# Patient Record
Sex: FEMALE | Race: WHITE | Employment: UNEMPLOYED | URBAN - METROPOLITAN AREA
[De-identification: names, ages, dates, MRNs, and addresses within clinical notes are randomized per-mention and may not be internally consistent; named-entity substitution may affect disease eponyms.]

---

## 2017-02-06 DIAGNOSIS — G35 MULTIPLE SCLEROSIS (H): Primary | ICD-10-CM

## 2017-02-06 DIAGNOSIS — F41.1 ANXIETY STATE: ICD-10-CM

## 2017-02-06 DIAGNOSIS — Z13.6 CARDIOVASCULAR SCREENING; LDL GOAL LESS THAN 160: ICD-10-CM

## 2017-02-06 DIAGNOSIS — F33.0 MAJOR DEPRESSIVE DISORDER, RECURRENT EPISODE, MILD (H): Primary | ICD-10-CM

## 2017-02-06 RX ORDER — CHLORAL HYDRATE 500 MG
2 CAPSULE ORAL DAILY
Qty: 60 CAPSULE | Refills: 5 | Status: SHIPPED
Start: 2017-02-06 | End: 2017-12-15

## 2017-02-06 RX ORDER — ESCITALOPRAM OXALATE 10 MG/1
10 TABLET ORAL DAILY
Qty: 90 TABLET | Refills: 1 | Status: CANCELLED | OUTPATIENT
Start: 2017-02-06

## 2017-02-06 NOTE — TELEPHONE ENCOUNTER
e     Last Written Prescription Date: ***  Last Fill Quantity: ***, # refills: ***  Last Office Visit with [facility] [place] care provider:  ***        Last PHQ-9 score on record=   PHQ-9 SCORE 9/16/2016   Total Score -   Total Score 0

## 2017-02-06 NOTE — TELEPHONE ENCOUNTER
Buspirone 10mg tabs       Last Written Prescription Date: 11/04/2016  Last Fill Quantity: 120; # refills: 1  Last Office Visit with [facility], [facility] or [facility] prescribing provider:  09/16/2016        Last PHQ-9 score on record=   PHQ-9 SCORE 9/16/2016   Total Score -   Total Score 0       AST       23   2/4/2016  ALT       52   2/4/2016      Escitalopram 20mg tabs     Last Written Prescription Date: 08/04/2016  Last Fill Quantity: 90, # refills: 1  Last Office Visit with [facility] [place] care provider:  09/16/2016        Last PHQ-9 score on record=   PHQ-9 SCORE 9/16/2016   Total Score -   Total Score 0

## 2017-02-06 NOTE — TELEPHONE ENCOUNTER
Escitalopram 10mg tabs     Last Written Prescription Date: 08/04/2016  Last Fill Quantity: 90, # refills: 1  Last Office Visit with [facility] primary care provider:  09/16/2016        Last PHQ-9 score on record=   PHQ-9 SCORE 9/16/2016   Total Score -   Total Score 0

## 2017-02-06 NOTE — TELEPHONE ENCOUNTER
Fish oil 1000mg caps      Last Written Prescription Date: 05/02/2016  Last Fill Quantity: 60,  # refills: 5   Last Office Visit with [facility], [facility] or [facility] prescribing provider: 09/16/2016

## 2017-02-07 RX ORDER — BUSPIRONE HYDROCHLORIDE 10 MG/1
10 TABLET ORAL 4 TIMES DAILY
Qty: 120 TABLET | Refills: 0 | Status: SHIPPED | OUTPATIENT
Start: 2017-02-07 | End: 2017-03-22

## 2017-02-07 RX ORDER — ESCITALOPRAM OXALATE 20 MG/1
20 TABLET ORAL DAILY
Qty: 30 TABLET | Refills: 0 | Status: SHIPPED | OUTPATIENT
Start: 2017-02-07 | End: 2017-03-22

## 2017-02-08 ENCOUNTER — TELEPHONE (OUTPATIENT)
Dept: FAMILY MEDICINE | Facility: CLINIC | Age: 47
End: 2017-02-08

## 2017-02-08 DIAGNOSIS — F33.0 MAJOR DEPRESSIVE DISORDER, RECURRENT EPISODE, MILD (H): Primary | ICD-10-CM

## 2017-02-08 RX ORDER — ESCITALOPRAM OXALATE 10 MG/1
10 TABLET ORAL DAILY
Qty: 30 TABLET | Refills: 0 | Status: SHIPPED | OUTPATIENT
Start: 2017-02-08 | End: 2017-03-23

## 2017-02-08 NOTE — TELEPHONE ENCOUNTER
[place] pharmacist calls re 2/7/17 escitalopram 20 mg Rx refill.   Pt takes 20 AND 10 for total 30 mg daily.   Requests new 10 mg Rx.     Rosetta, this looks OK.  I tried to call Romelia but she did not answer her phone.   T'd up.    Stewart Muller, RN

## 2017-02-08 NOTE — Clinical Note
[address] [id number] [phone]          February 8, 2017    Cailin Hall                                                                                                                     [id number]    [facility] [address]            Sanjay León,     We sent one month refills of escitalopram (10 and 20 mg) to your pharmacy today. This is a reminder from Rosetta that you are due for an office visit before your next refill.    Scheduling [phone]. Let us know if you have any questions.     Take Stewart williamson RN for Rosetta Calolway PA-C

## 2017-03-22 ENCOUNTER — TELEPHONE (OUTPATIENT)
Dept: FAMILY MEDICINE | Facility: CLINIC | Age: 47
End: 2017-03-22

## 2017-03-22 DIAGNOSIS — F33.0 MAJOR DEPRESSIVE DISORDER, RECURRENT EPISODE, MILD (H): ICD-10-CM

## 2017-03-22 DIAGNOSIS — F41.1 ANXIETY STATE: ICD-10-CM

## 2017-03-22 NOTE — TELEPHONE ENCOUNTER
Escitalopram     Last Written Prescription Date: 02/08/17  Last Fill Quantity: 30, # refills: 0  [place] [facility] [place]:  [place]16/16 Eleonora Calloway        Last PHQ-9 score on record=   PHQ-9 SCORE 9/16/2016   Total Score -   Total Score 0         Buspirone       Last Written Prescription Date: 02/07/17  Last Fill Quantity: 120; # refills: 0  [place] [facility] [place] [facility] or [facility] prescribing provider:  09/16/16 Eleonora Calloway        Last PHQ-9 score on record=   PHQ-9 SCORE 9/16/2016   Total Score -   Total Score 0       Lab Results   Component Value Date    AST 23 02/04/2016     Lab Results   Component Value Date    ALT 52 02/04/2016

## 2017-03-23 RX ORDER — ESCITALOPRAM OXALATE 20 MG/1
20 TABLET ORAL DAILY
Qty: 30 TABLET | Refills: 0 | Status: SHIPPED | OUTPATIENT
Start: 2017-03-23 | End: 2017-05-02

## 2017-03-23 RX ORDER — ESCITALOPRAM OXALATE 10 MG/1
10 TABLET ORAL DAILY
Qty: 30 TABLET | Refills: 0 | Status: SHIPPED | OUTPATIENT
Start: 2017-03-23 | End: 2017-05-02

## 2017-03-23 RX ORDER — BUSPIRONE HYDROCHLORIDE 10 MG/1
10 TABLET ORAL 4 TIMES DAILY
Qty: 120 TABLET | Refills: 0 | Status: SHIPPED | OUTPATIENT
Start: 2017-03-23 | End: 2017-05-02

## 2017-03-23 NOTE — TELEPHONE ENCOUNTER
Rosetta, I could send [employer] message due for visit - will you refill these?  Stewart Muller RN

## 2017-03-23 NOTE — TELEPHONE ENCOUNTER
Escitalopram     Last Written Prescription Date: ***  Last Fill Quantity: ***, # refills: ***  Last Office Visit with [facility] [place] primary care provider:  ***        Last PHQ-9 score on record=   PHQ-9 SCORE 9/16/2016   Total Score -   Total Score 0

## 2017-03-23 NOTE — TELEPHONE ENCOUNTER
Yes, please send pt [employer] message Rx's filled for 1 month.    Can we try to call her as well?    Thanks!

## 2017-05-02 DIAGNOSIS — F33.0 MAJOR DEPRESSIVE DISORDER, RECURRENT EPISODE, MILD (H): ICD-10-CM

## 2017-05-02 DIAGNOSIS — F41.1 ANXIETY STATE: ICD-10-CM

## 2017-05-03 NOTE — TELEPHONE ENCOUNTER
Escitaloram 20 mg      Last Written Prescription Date: 03/23/17  Last Fill Quantity: 30, # refills: 0  Last [place] with [facility] [place]:  04/18/17 Rosetta Hennessy        Last PHQ-9 score on record=   PHQ-9 SCORE 9/16/2016   Total Score -   Total Score 0       Escitalopram 10 mg      Last Written Prescription Date: 03/23/17  Last Fill Quantity: 30, # refills: 0  Last Office Visit with [facility] [place] primary care provider:  04/18/17 Rosetta hennessy        Last PHQ-9 score on record=   PHQ-9 SCORE 9/16/2016   Total Score -   Total Score 0       Buspirone 10 mg        Last Written Prescription Date: 03/23/17  Last Fill Quantity: 120; # refills: 0  Last Office Visit with [facility] [place], [facility] or [facility] prescribing provider:  04/18/17 Rosetta Hennessy        Last PHQ-9 score on record=   PHQ-9 SCORE 9/16/2016   Total Score -   Total Score 0       Lab Results   Component Value Date    AST 23 02/04/2016     Lab Results   Component Value Date    ALT 52 02/04/2016

## 2017-05-04 RX ORDER — ESCITALOPRAM OXALATE 10 MG/1
TABLET ORAL
Qty: 30 TABLET | Refills: 0 | Status: SHIPPED | OUTPATIENT
Start: 2017-05-04 | End: 2017-05-16

## 2017-05-04 RX ORDER — BUSPIRONE HYDROCHLORIDE 10 MG/1
TABLET ORAL
Qty: 120 TABLET | Refills: 0 | Status: SHIPPED | OUTPATIENT
Start: 2017-05-04 | End: 2017-05-16

## 2017-05-04 RX ORDER — ESCITALOPRAM OXALATE 20 MG/1
TABLET ORAL
Qty: 30 TABLET | Refills: 0 | Status: SHIPPED | OUTPATIENT
Start: 2017-05-04 | End: 2017-05-16

## 2017-05-04 NOTE — TELEPHONE ENCOUNTER
Pt informed due for visit. No showed 4/18/17.  Pt states she lives in [place] but plans on followoing with Rosetta.   We scheduled visit next week.   Prescriptions approved per [facility] Refill Protocol. Pt states all refills thru [facility] mail order pharmacy.   Stewart Muller RN

## 2017-05-16 ENCOUNTER — OFFICE VISIT (OUTPATIENT)
Dept: FAMILY MEDICINE | Facility: CLINIC | Age: 47
End: 2017-05-16
Payer: MEDICARE

## 2017-05-16 VITALS
TEMPERATURE: 97.9 F | DIASTOLIC BLOOD PRESSURE: 78 MMHG | OXYGEN SATURATION: 94 % | WEIGHT: 202.6 LBS | HEIGHT: 62 IN | HEART RATE: 110 BPM | SYSTOLIC BLOOD PRESSURE: 132 MMHG | BODY MASS INDEX: 37.28 KG/M2 | RESPIRATION RATE: 16 BRPM
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
## 2017-05-16 DIAGNOSIS — G35 MULTIPLE SCLEROSIS (H): Primary | ICD-10-CM

## 2017-05-16 DIAGNOSIS — N92.0 MENORRHAGIA WITH REGULAR CYCLE: ICD-10-CM

## 2017-05-16 DIAGNOSIS — K59.02 CONSTIPATION DUE TO OUTLET DYSFUNCTION: ICD-10-CM

## 2017-05-16 DIAGNOSIS — F41.1 ANXIETY STATE: ICD-10-CM

## 2017-05-16 DIAGNOSIS — F33.0 MAJOR DEPRESSIVE DISORDER, RECURRENT EPISODE, MILD (H): ICD-10-CM

## 2017-05-16 LAB — BETA HCG QUAL IFA URINE: NEGATIVE

## 2017-05-16 PROCEDURE — 84703 CHORIONIC GONADOTROPIN ASSAY: CPT | Performed by: PHYSICIAN ASSISTANT

## 2017-05-16 PROCEDURE — 99214 OFFICE O/P EST MOD 30 MIN: CPT | Performed by: PHYSICIAN ASSISTANT

## 2017-05-16 RX ORDER — ESCITALOPRAM OXALATE 20 MG/1
40 TABLET ORAL DAILY
Qty: 90 TABLET | Refills: 1 | Status: SHIPPED | OUTPATIENT
Start: 2017-05-16 | End: 2017-08-23

## 2017-05-16 RX ORDER — BUSPIRONE HYDROCHLORIDE 10 MG/1
TABLET ORAL
Qty: 120 TABLET | Refills: 0 | Status: SHIPPED | OUTPATIENT
Start: 2017-05-16 | End: 2017-10-02

## 2017-05-16 RX ORDER — POLYETHYLENE GLYCOL 3350 17 G/17G
1 POWDER, FOR SOLUTION ORAL DAILY
Qty: 510 G | Refills: 3 | Status: SHIPPED | OUTPATIENT
Start: 2017-05-16 | End: 2017-11-28

## 2017-05-16 RX ORDER — OXYBUTYNIN CHLORIDE 5 MG/1
5 TABLET ORAL 3 TIMES DAILY
Qty: 270 TABLET | Refills: 3 | Status: SHIPPED | OUTPATIENT
Start: 2017-05-16

## 2017-05-16 RX ORDER — AMITRIPTYLINE HYDROCHLORIDE 100 MG/1
100 TABLET ORAL AT BEDTIME
Qty: 90 TABLET | Refills: 1 | Status: SHIPPED | OUTPATIENT
Start: 2017-05-16 | End: 2017-10-03

## 2017-05-16 RX ORDER — BUPROPION HYDROCHLORIDE 300 MG/1
300 TABLET ORAL EVERY MORNING
Qty: 90 TABLET | Refills: 1 | Status: SHIPPED | OUTPATIENT
Start: 2017-05-16

## 2017-05-16 RX ORDER — HYDROCORTISONE VALERATE CREAM 2 MG/G
CREAM TOPICAL
Qty: 45 G | Refills: 0 | Status: SHIPPED | OUTPATIENT
Start: 2017-05-16

## 2017-05-16 RX ORDER — MEDROXYPROGESTERONE ACETATE 150 MG/ML
150 INJECTION, SUSPENSION INTRAMUSCULAR
Qty: 3 ML | Refills: 3 | OUTPATIENT
Start: 2017-05-16

## 2017-05-16 RX ORDER — ARMODAFINIL 250 MG/1
250 TABLET ORAL EVERY MORNING
Qty: 30 TABLET | Refills: 0 | Status: SHIPPED | OUTPATIENT
Start: 2017-05-16 | End: 2017-07-18

## 2017-05-16 NOTE — PROGRESS NOTES
SUBJECTIVE:                                                    Cailin Hall is a 46 year old female who presents to clinic today for the following health issues:        Depression and Anxiety Follow-Up    Status since last visit: Worsened     Other associated symptoms:eating more    Complicating factors:     Significant life event: Yes-  Living situations      Current substance abuse: None    PHQ-9 SCORE 1/17/2013 7/9/2013 9/16/2016   Total Score 6 6 -   Total Score - - 0     SARI-7 SCORE 9/29/2011 9/16/2016   Total Score 14 -   Total Score - 0        PHQ-9  English      PHQ-9   Any Language     GAD7       Amount of exercise or physical activity: None    Problems taking medications regularly: No    Medication side effects: none    Diet: regular (no restrictions)      Constipation     Onset: years    Description:   Frequency of bowel movements: 2-3 days.  Stool consistency: hard    Progression of Symptoms:  same    Accompanying Signs & Symptoms:  Abdominal pain (cramping?): no   Blood in stool: no   Rectal pain: no   Nausea/vomiting: no   Weight loss or gain: no    History:   History of abdominal surgery: no     Precipitating factors:   Recent use of narcotics, anticholinergics, calcium channel blockers, antacids, or iron supplements: no   Chronic Laxative Use: YES         Therapies Tried and outcome: glycerin suppositories, increase in fiber, increase in fluids, laxatives, stool softeners, suppositories and miralax, work well    Concern - bug bites     Onset: 1 week    Description:   Bug butes    Intensity: mild    Progression of Symptoms:  improving    Accompanying Signs & Symptoms:  New cats and dog in home       Previous history of similar problem:       Precipitating factors:   Worsened by:     Alleviating factors:  Improved by:        Therapies Tried and outcome:     MS--  Overall pt feels symptoms are stable. No new flares or progression of symptoms.   Seeing Neurology in 3 weeks, but needs some meds  "filled until then.   Using walker.   Needs refills on catheters and pads.    Menorrhagia.  Patient states periods occur monthly, lasting 3-5 days  First few days are heavy. Very difficult for pt to care for self with MS and heavy periods.  Has used Depo in the past and would like to restart this.     Problem list and histories reviewed & adjusted, as indicated.  Additional history: as documented    Patient Active Problem List   Diagnosis     Multiple sclerosis (H)     Obesity     Esophageal reflux     Absence of menstruation     Constipation     Anxiety state     CARDIOVASCULAR SCREENING; LDL GOAL LESS THAN 160     Mild major depression (H)     Past Surgical History:   Procedure Laterality Date     C  DELIVERY ONLY      , Low Cervical     C NONSPECIFIC PROCEDURE       x 1     C NONSPECIFIC PROCEDURE      IUFD     CHOLECYSTECTOMY, LAPOROSCOPIC      Cholecystectomy, Laparoscopic     HC REDUCTION OF LARGE BREAST         Social History   Substance Use Topics     Smoking status: Former Smoker     Quit date: 1999     Smokeless tobacco: Never Used     Alcohol use Yes      Comment: very rarely     Family History   Problem Relation Age of Onset     Neurologic Disorder Mother      ms     Musculoskeletal Disorder Mother      MS- diag      DIABETES Maternal Grandmother      DIABETES Maternal Uncle      DIABETES Maternal Aunt            Reviewed and updated as needed this visit by clinical staff  Tobacco  Allergies  Med Hx  Surg Hx  Fam Hx  Soc Hx      Reviewed and updated as needed this visit by Provider         ROS:  Constitutional, HEENT, cardiovascular, pulmonary, GI, , musculoskeletal, neuro, skin, endocrine and psych systems are negative, except as otherwise noted.    OBJECTIVE:                                                    /78 (BP Location: Right arm, Patient Position: Chair, Cuff Size: Adult Large)  Pulse 110  Temp 97.9  F (36.6  C) (Oral)  Resp 16  Ht 5' 2.2\" (1.58 " "m)  Wt 202 lb 9.6 oz (91.9 kg)  SpO2 94%  Breastfeeding? No  BMI 36.82 kg/m2  Body mass index is 36.82 kg/(m^2).  GENERAL APPEARANCE: healthy, alert and no distress  RESP: lungs clear to auscultation - no rales, rhonchi or wheezes  CV: regular rates and rhythm, normal S1 S2, no S3 or S4 and no murmur, click or rub  ABDOMEN: soft, nontender, without hepatosplenomegaly or masses and bowel sounds normal  SKIN: no suspicious lesions or rashes  PSYCH: mentation appears normal and affect flat    Diagnostic Test Results:  Results for orders placed or performed in visit on 05/16/17 (from the past 24 hour(s))   Beta HCG qual IFA urine   Result Value Ref Range    Beta HCG Qual IFA Urine Negative NEG        ASSESSMENT/PLAN:                                                            1. Multiple sclerosis (H)  Seeing Neuro next month. Will fill Nuvigil x 1 until pt can f/u there.     - amitriptyline (ELAVIL) 25 MG tablet; Take 1-2 tablets (25-50 mg) by mouth At Bedtime  Dispense: 90 tablet; Refill: 1  - amitriptyline (ELAVIL) 100 MG tablet; Take 1 tablet (100 mg) by mouth At Bedtime  Dispense: 90 tablet; Refill: 1  - Catheters (SELF-CATH STRAIGHT TIP SOFT) MISC; 1 Units 6 times daily 14 french, 6\", female straight tip  Dispense: 540 each; Refill: 3  - Multiple Vitamin (MULTI-DAY) TABS; Take 1 Dose by mouth daily  Dispense: 100 tablet; Refill: 3  - calcium carbonate (OS-BRODY 600 MG [place]. CA) 1500 (600 CA) MG tablet; Take 1 tablet (1,500 mg) by mouth daily  Dispense: 180 tablet; Refill: 3  - polyethylene glycol (MIRALAX) powder; Take 17 g (1 capful) by mouth daily  Dispense: 510 g; Refill: 3  - diphenhydrAMINE-acetaminophen (TYLENOL PM)  MG tablet; Take 1 tablet by mouth nightly as needed for sleep  Dispense: 100 tablet; Refill: 3  - oxybutynin (DITROPAN) 5 MG tablet; Take 1 tablet (5 mg) by mouth 3 times daily  Dispense: 270 tablet; Refill: 3  - order for DME; Equipment being ordered: thin deodorant incontinence pads, " use 6 per day  Dispense: 3 Box; Refill: 6  - armodafinil (NUVIGIL) 250 MG TABS tablet; Take 1 tablet (250 mg) by mouth every morning  Dispense: 30 tablet; Refill: 0    2. Major depressive disorder, recurrent episode, mild (H)  Will increase lexapro to 40 mg  F/u in 6 months.   - escitalopram (LEXAPRO) 20 MG tablet; Take 2 tablets (40 mg) by mouth daily  Dispense: 90 tablet; Refill: 1  - buPROPion (WELLBUTRIN XL) 300 MG 24 hr tablet; Take 1 tablet (300 mg) by mouth every morning  Dispense: 90 tablet; Refill: 1    3. Anxiety state  Stable.   - busPIRone (BUSPAR) 10 MG tablet; TAKE ONE TABLET BY MOUTH FOUR TIMES A DAY  Dispense: 120 tablet; Refill: 0    4. Menorrhagia with regular cycle  Depo given  - Beta HCG qual IFA urine  - medroxyPROGESTERone (DEPO-PROVERA) 150 MG/ML injection; Inject 1 mL (150 mg) into the muscle every 3 months  Dispense: 3 mL; Refill: 3    5. Constipation due to outlet dysfunction  Increase fiber and water.   - polyethylene glycol (MIRALAX) powder; Take 17 g (1 capful) by mouth daily  Dispense: 510 g; Refill: 3    6. Bug bite, initial encounter    - hydrocortisone (WESTCORT) 0.2 % cream; Apply sparingly to affected area three times daily as needed.  Dispense: 45 g; Refill: 0        Rosetta Calloway PA-C, JORDEN  [facility]

## 2017-05-16 NOTE — MR AVS SNAPSHOT
After Visit Summary   5/16/2017    Cailin Hall    MRN: [id number]           Patient Information     Date Of Birth          1970        Visit Information        Provider Department      5/16/2017 11:15 AM Rosetta Calloway PA-C [facility]        Today's Diagnoses     Multiple sclerosis (H)    -  1    Major depressive disorder, recurrent episode, mild (H)        Anxiety state        Bug bite, initial encounter        CARDIOVASCULAR SCREENING; LDL GOAL LESS THAN 160           Follow-ups after your visit        Who to contact     If you have questions or need follow up information about today's clinic visit or your schedule please contact [facility] directly at [phone].  Normal or non-critical lab and imaging results will be communicated to you by Unifiedhart, letter or phone within 4 business days after the clinic has received the results. If you do not hear from us within 7 days, please contact the clinic through Unifiedhart or phone. If you have a critical or abnormal lab result, we will notify you by phone as soon as possible.  Submit refill requests through [employer] or call your pharmacy and they will forward the refill request to us. Please allow 3 business days for your refill to be completed.          Additional Information About Your Visit        MyChart Information     [employer] gives you secure access to your electronic health record. If you see a primary care provider, you can also send messages to your care team and make appointments. If you have questions, please call your primary care clinic.  If you do not have a primary care provider, please call [phone] and they will assist you.        Care EveryWhere ID     This is your Care EveryWhere ID. This could be used by other organizations to access your [place] medical records  [id number]        Your Vitals Were     Pulse Temperature Respirations Height Pulse Oximetry Breastfeeding?     "110 97.9  F (36.6  C) (Oral) 16 5' 2.2\" (1.58 m) 94% No    BMI (Body Mass Index)                   36.82 kg/m2            Blood Pressure from Last 3 Encounters:   05/16/17 132/78   09/16/16 127/83   02/04/16 135/80    Weight from Last 3 Encounters:   05/16/17 202 lb 9.6 oz (91.9 kg)   09/16/16 210 lb 12.8 oz (95.6 kg)   02/04/16 221 lb (100.2 kg)              Today, you had the following     No orders found for display         Today's Medication Changes          These changes are accurate as of: 5/16/17 12:00 PM.  If you have any questions, ask your nurse or doctor.               Start taking these medicines.        Dose/Directions    calcium carbonate 1500 (600 CA) MG tablet   Commonly known as:  OS-BRODY 600 mg [place]. Ca   Used for:  Multiple sclerosis (H)   Started by:  Rosetta Calloway PA-C        Dose:  1500 mg   Take 1 tablet (1,500 mg) by mouth daily   Quantity:  180 tablet   Refills:  3       escitalopram 20 MG tablet   Commonly known as:  LEXAPRO   Used for:  Major depressive disorder, recurrent episode, mild (H)   Started by:  Rosetta Calloway PA-C        Dose:  40 mg   Take 2 tablets (40 mg) by mouth daily   Quantity:  90 tablet   Refills:  1       hydrocortisone 0.2 % cream   Commonly known as:  WESTCORT   Used for:  Bug bite, initial encounter   Started by:  Rosetta aClloway PA-C        Apply sparingly to affected area three times daily as needed.   Quantity:  45 g   Refills:  0       polyethylene glycol powder   Commonly known as:  MIRALAX   Used for:  Multiple sclerosis (H)   Started by:  Rosetta Calloway PA-C        Dose:  1 capful   Take 17 g (1 capful) by mouth daily   Quantity:  510 g   Refills:  3         These medicines have changed or have updated prescriptions.        Dose/Directions    busPIRone 10 MG tablet   Commonly known as:  BUSPAR   This may have changed:  See the new instructions.   Used for:  Anxiety state   Changed by:  Rosetta Calloway PA-C     " "   TAKE ONE TABLET BY MOUTH FOUR TIMES A DAY   Quantity:  120 tablet   Refills:  0       * Catheters Misc   This may have changed:  Another medication with the same name was added. Make sure you understand how and when to take each.   Used for:  Multiple sclerosis (H)   Changed by:  Rosetta Calloway PA-C        as directed-intermittent catheters straight tipped 14FR-6\"   Quantity:  160 Units   Refills:  prn       * Self-Cath Straight Tip Soft Misc   This may have changed:  You were already taking a medication with the same name, and this prescription was added. Make sure you understand how and when to take each.   Used for:  Multiple sclerosis (H)   Changed by:  Rosetta Calloway PA-C        Dose:  1 Units   1 Units 6 times daily 14 [place], 6\", female straight tip   Quantity:  540 each   Refills:  3       fish oil-omega-3 fatty acids 1000 MG capsule   This may have changed:  Another medication with the same name was removed. Continue taking this medication, and follow the directions you see here.   Used for:  Multiple sclerosis (H), CARDIOVASCULAR SCREENING; LDL GOAL LESS THAN 160   Changed by:  Sandra Marie MD        Dose:  2 g   Take 2 capsules (2 g) by mouth daily   Quantity:  60 capsule   Refills:  5       * order for DME   This may have changed:  Another medication with the same name was removed. Continue taking this medication, and follow the directions you see here.   Used for:  Multiple sclerosis (H)   Changed by:  Sandy Lacy PA-C        Equipment being ordered: Motorized scooter with handle bar controls with storage, and headlight   Quantity:  1 Device   Refills:  0       * order for DME   This may have changed:  Another medication with the same name was removed. Continue taking this medication, and follow the directions you see here.   Used for:  Mild major depression (H)   Changed by:  Sandy Lacy PA-C        Equipment being ordered: light box   Quantity:  1 Device " "  Refills:  0       * WELLBUTRIN  MG 24 hr tablet   This may have changed:  Another medication with the same name was added. Make sure you understand how and when to take each.   Used for:  Depressive disorder, not elsewhere classified   Generic drug:  buPROPion   Changed by:  Anisha Villanueva MD        ONE TABLET DAILY   Quantity:  30 Tab   Refills:  5       * buPROPion 300 MG 24 hr tablet   Commonly known as:  WELLBUTRIN XL   This may have changed:  You were already taking a medication with the same name, and this prescription was added. Make sure you understand how and when to take each.   Used for:  Major depressive disorder, recurrent episode, mild (H)   Changed by:  Rosetta Calloway PA-C        Dose:  300 mg   Take 1 tablet (300 mg) by mouth every morning   Quantity:  90 tablet   Refills:  1       * Notice:  This list has 6 medication(s) that are the same as other medications prescribed for you. Read the directions carefully, and ask your doctor or other care provider to review them with you.      Stop taking these medicines if you haven't already. Please contact your care team if you have questions.     syringe/needle (disp) 25G X 1\" 3 ML Misc   Commonly known as:  BD INTEGRA SYRINGE   Stopped by:  Rosetta Calloway PA-C                Where to get your medicines      These medications were sent to [facility] - [place], [place] - [address]  [address] [place] [id number]    Hours:  Mon-Fri 8:30am-5:00pm Toll Free [phone] Phone:  [phone]     amitriptyline 100 MG tablet    amitriptyline 25 MG tablet    buPROPion 300 MG 24 hr tablet    busPIRone 10 MG tablet    calcium carbonate 1500 (600 CA) MG tablet    escitalopram 20 MG tablet    hydrocortisone 0.2 % cream    MULTI-DAY Tabs    polyethylene glycol powder         Some of these will need a paper prescription and others can be bought over the counter.  Ask your nurse if " "you have questions.     Bring a paper prescription for each of these medications     Self-Cath Straight Tip Soft Misc                Primary Care Provider Office Phone # Fax #    Rosetta Suztete Calloway PA-C [phone]       [facility] [address]  [facility] [address]        Thank you!     Thank you for choosing [facility]  for your care. Our goal is always to provide you with excellent care. Hearing back from our patients is one way we can continue to improve our services. Please take a few minutes to complete the written survey that you may receive in the mail after your visit with us. Thank you!             Your Updated Medication List - Protect others around you: Learn how to safely use, store and throw away your medicines at www.disposemymeds.org.          This list is accurate as of: 5/16/17 12:00 PM.  Always use your most recent med list.                   Brand Name Dispense Instructions for use    * amitriptyline 25 MG tablet    ELAVIL    90 tablet    Take 1-2 tablets (25-50 mg) by mouth At Bedtime       * amitriptyline 100 MG tablet    ELAVIL    90 tablet    Take 1 tablet (100 mg) by mouth At Bedtime       AMPYRA 10 MG Tb12   Generic drug:  Dalfampridine      Take  by mouth 2 times daily.       baclofen 20 MG tablet    LIORESAL     Take 10 mg by mouth 4 times daily.       busPIRone 10 MG tablet    BUSPAR    120 tablet    TAKE ONE TABLET BY MOUTH FOUR TIMES A DAY       calcium carbonate 1500 (600 CA) MG tablet    OS-BRODY 600 mg [place]. Ca    180 tablet    Take 1 tablet (1,500 mg) by mouth daily       calcium polycarbophil 625 MG tablet    FIBERCON    20 tablet    Take 2 tablets (1,250 mg) by mouth daily       * Catheters Misc     160 Units    as directed-intermittent catheters straight tipped 14FR-6\"       * Self-Cath Straight Tip Soft Misc     540 each    1 Units 6 times daily 14 [place], 6\", female straight tip       cholecalciferol 5000 UNITS Tabs tablet "    vitamin D3     Take 1 tablet (5,000 Units) by mouth daily (prescribed by MS provider)       DDAVP 0.2 MG tablet   Generic drug:  desmopressin      Take 0.2 mg by mouth At Bedtime.       desmopressin acetate spray 0.01 % Soln      Spray 1-2 sprays in nostril nightly as needed.       dimethyl fumarate 120 MG Cpdr    TECFIDERA     Take by mouth 2 times daily       diphenhydrAMINE-acetaminophen  MG tablet    TYLENOL PM    180 tablet    Take 2 tablets by mouth At Bedtime       escitalopram 20 MG tablet    LEXAPRO    90 tablet    Take 2 tablets (40 mg) by mouth daily       fish oil-omega-3 fatty acids 1000 MG capsule     60 capsule    Take 2 capsules (2 g) by mouth daily       hydrocortisone 0.2 % cream    WESTCORT    45 g    Apply sparingly to affected area three times daily as needed.       MULTI-DAY Tabs     100 tablet    Take 1 Dose by mouth daily       NITROFURANTOIN      100 mg daily.       NUVIGIL PO      Take  by mouth.       * order for DME     1 Device    Equipment being ordered: Motorized scooter with handle bar controls with storage, and headlight       * order for DME     1 Device    Equipment being ordered: light box       * order for DME     1 Device    Equipment being ordered: 4 wheeled walker with seat, basket, brakes  FAX TO [facility] [phone]       * order for DME     3 Box    Equipment being ordered: thin deodorant incontinence pads       * order for DME     1 each    Equipment being ordered: 4-wheeled walker with seat and brakes.  Length of need:  99 (lifetime)  Diagnosis:  Multiple Sclerosis       * order for DME     1 each    Equipment being ordered: 4-wheeled walker with seat and brakes.  Length of need:  99 (lifetime)  Diagnosis:  Multiple Sclerosis       oxybutynin 5 MG tablet    DITROPAN     1 TABLET 3 TIMES DAILY       POLYETHYLENE GLYCOL      17 gm daily       polyethylene glycol powder    MIRALAX    510 g    Take 17 g (1 capful) by mouth daily       senna-docusate 8.6-50 MG per  tablet    SENOKOT-S;PERICOLACE     Take 2 tablets by mouth At Bedtime       simethicone 80 MG Tabs     30    1 tablet po q4-6 hrs as needed for gas or bloating       valACYclovir 1000 mg tablet    VALTREX    20 tablet    2 tabs at onset, and 2 tabs 12 hrs later       * WELLBUTRIN  MG 24 hr tablet   Generic drug:  buPROPion     30 Tab    ONE TABLET DAILY       * buPROPion 300 MG 24 hr tablet    WELLBUTRIN XL    90 tablet    Take 1 tablet (300 mg) by mouth every morning       * Notice:  This list has 12 medication(s) that are the same as other medications prescribed for you. Read the directions carefully, and ask your doctor or other care provider to review them with you.

## 2017-05-17 ASSESSMENT — PATIENT HEALTH QUESTIONNAIRE - PHQ9: SUM OF ALL RESPONSES TO PHQ QUESTIONS 1-9: 12

## 2017-05-20 DIAGNOSIS — G35 MULTIPLE SCLEROSIS (H): ICD-10-CM

## 2017-05-20 NOTE — TELEPHONE ENCOUNTER
Dose change on Tylenol PM 2QHS    Tylenol      Last Written Prescription Date: 11/4/2016  Last Fill Quantity: 180,  # refills: 1   Last Office Visit with [facility], [facility] or [facility] prescribing provider: 5/16/2017                                         Next 5 appointments (look out 90 days)     May 23, 2017 11:30 AM CDT   PHYSICAL with Rosetta Callow[facility] PA-C   [facility]    [address] [place] [id number] [phone]

## 2017-05-25 ENCOUNTER — TELEPHONE (OUTPATIENT)
Dept: FAMILY MEDICINE | Facility: CLINIC | Age: 47
End: 2017-05-25
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
## 2017-05-25 RX ORDER — HYDROCORTISONE BUTYRATE 0.1 %
CREAM (GRAM) TOPICAL
Qty: 45 G | Refills: 1 | Status: SHIPPED | OUTPATIENT
Start: 2017-05-25

## 2017-06-28 ENCOUNTER — TELEPHONE (OUTPATIENT)
Dept: FAMILY MEDICINE | Facility: CLINIC | Age: 47
End: 2017-06-28

## 2017-06-28 NOTE — TELEPHONE ENCOUNTER
Nya calling from [facility] calling for orders.    Skilled Nursing  [id number]/[id number]    Verbal order given.  Will fax for MD orders.  Merlyn Gutierrez RN

## 2017-07-18 DIAGNOSIS — G35 MULTIPLE SCLEROSIS (H): ICD-10-CM

## 2017-07-18 RX ORDER — ARMODAFINIL 250 MG/1
250 TABLET ORAL EVERY MORNING
Qty: 30 TABLET | Refills: 3 | Status: SHIPPED | OUTPATIENT
Start: 2017-07-18 | End: 2017-09-29

## 2017-07-18 NOTE — TELEPHONE ENCOUNTER
armodafinil 250mg      Last Written Prescription Date:  6/23/17  Last Fill Quantity: 30,   # refills: 00  Last Office Visit with [facility], [facility] or [facility] prescribing provider: 5/16/17  Future Office visit:    Next 5 appointments (look out 90 days)     Jul 20, 2017 10:15 AM CDT   PH[facility] with [facility]   [facility]    [address] [place] [id number] [phone]                   Routing refill request to provider for review/approval because:  Drug not on the [facility], [facility] or [facility] refill protocol or controlled substance    Rianna Estevez   [facility]  [phone]

## 2017-07-19 ENCOUNTER — TELEPHONE (OUTPATIENT)
Dept: FAMILY MEDICINE | Facility: CLINIC | Age: 47
End: 2017-07-19

## 2017-07-19 NOTE — TELEPHONE ENCOUNTER
[facility] Pharmacist calls to verify OK to dispense both?    amitriptyline (ELAVIL) 25 MG tablet 90 tablet 1 5/16/2017  No   Sig: Take 1-2 tablets (25-50 mg) by mouth At Bedtime     amitriptyline (ELAVIL) 100 MG tablet 90 tablet 1 5/16/2017  No   Sig: Take 1 tablet (100 mg) by mouth At Bedtime     Informed yes per CJ.  Message handled by Nurse Triage with [facility] - provider name: Rosetta Calloway PA-C.  Stewart Muller RN

## 2017-07-27 ENCOUNTER — TELEPHONE (OUTPATIENT)
Dept: FAMILY MEDICINE | Facility: CLINIC | Age: 47
End: 2017-07-27

## 2017-07-27 DIAGNOSIS — G35 MULTIPLE SCLEROSIS (H): ICD-10-CM

## 2017-07-27 NOTE — TELEPHONE ENCOUNTER
Spoke with pt would still like to be followed by PCP even though she is living in [place] for a short period of time w/daughter. Explained to pt that she will have to make an OV for med check for depression in Nov, (so she will have plenty of time to figure out transportation). SNV need to restart as she left [facility] to [facility] and SNV did not transfer over.    Writer SHANEL w/[facility] ([facility]) for SNV [place], [phone] or [phone]    SNV e/o/w for vitals, ADL check, med mgmt and general health secondary to MS.     PCP please sign Rx for [employer] as [facility] is being difficult in sending cathing supplies.  [employer] will send over new form to be signed as well.   Fax [phone].      Pt is going to [facility] for Depo shot tomorrow.

## 2017-07-28 ENCOUNTER — MEDICAL CORRESPONDENCE (OUTPATIENT)
Dept: HEALTH INFORMATION MANAGEMENT | Facility: CLINIC | Age: 47
End: 2017-07-28

## 2017-07-28 ENCOUNTER — TELEPHONE (OUTPATIENT)
Dept: FAMILY MEDICINE | Facility: CLINIC | Age: 47
End: 2017-07-28

## 2017-07-28 NOTE — TELEPHONE ENCOUNTER
Nya RN with [facility] office phone [phone] requests VO  Skilled nurse visits every other week for medication set up.   Informed approved per standing orders.   Home Care staff will fax these orders for Sandra Marie MD signature.   Message handled by Nurse Triage with [facility] - provider name: Rosetta Calloway PA-C.  Stewart Muller RN

## 2017-07-28 NOTE — TELEPHONE ENCOUNTER
Recd 2 page fax from [employer].  Pleaes sign Prescription/Certificate of Medical Necessity and fax to [phone].  Form in CJ in box.    Gwendolyn Avitia

## 2017-07-31 ENCOUNTER — TELEPHONE (OUTPATIENT)
Dept: FAMILY MEDICINE | Facility: CLINIC | Age: 47
End: 2017-07-31

## 2017-08-01 ENCOUNTER — TELEPHONE (OUTPATIENT)
Dept: FAMILY MEDICINE | Facility: CLINIC | Age: 47
End: 2017-08-01

## 2017-08-01 NOTE — TELEPHONE ENCOUNTER
Fax from [facility] home care requesting Dr. Marie approve home care orders.   Dr. Marie asked, through his assistant Abida, that I follow through though U am unfamiliar with home care written orders documentation processing, this usually goes through [facility].    We called Nya PRUETT [phone] to better understand what is needed.   Home care ordered by [facility] due to patient's diagnoses: MS and depression    Pt follows with Rosetta Calloway PA-C. They require MD sign orders.   They will ask Cailin to schedule visit with Dr. Marie.      After the visit, please fax visit note and orders:    1) skilled nurse visits 1 x week every other week for medication set up and general assessments.     2) med list  3) diagnosis: MS G35, mild major depression F32.0   4) history    Form from [facility] in Dr. Marie's folder for faxing requested documentation after appointment   Stewart Muller RN

## 2017-08-23 DIAGNOSIS — F33.0 MAJOR DEPRESSIVE DISORDER, RECURRENT EPISODE, MILD (H): ICD-10-CM

## 2017-08-23 NOTE — TELEPHONE ENCOUNTER
escitalopram (LEXAPRO) 20 MG tablet     Sig: Take 2 tablets (40 mg) by mouth daily      Last Written Prescription Date: 5/16/17  Last Fill Quantity: 90, # refills: 1  Last Office Visit with [facility] primary care provider: 5/16/2017       Last PHQ-9 score on record=   PHQ-9 SCORE 5/16/2017   Total Score -   Total Score 12         ALEX Zelaya  August 23, 2017  3:47 PM

## 2017-08-24 RX ORDER — ESCITALOPRAM OXALATE 20 MG/1
TABLET ORAL
Qty: 180 TABLET | Refills: 0 | Status: SHIPPED | OUTPATIENT
Start: 2017-08-24

## 2017-08-24 NOTE — TELEPHONE ENCOUNTER
Pt. Due for follow up in November.      Prescription approved per [facility] Refill Protocol.    Almaz GOEL RN, BSN, PHN  Hydes Flex RN

## 2017-09-29 ENCOUNTER — TELEPHONE (OUTPATIENT)
Dept: FAMILY MEDICINE | Facility: CLINIC | Age: 47
End: 2017-09-29

## 2017-09-29 DIAGNOSIS — F33.0 MAJOR DEPRESSIVE DISORDER, RECURRENT EPISODE, MILD (H): ICD-10-CM

## 2017-09-29 DIAGNOSIS — G35 MULTIPLE SCLEROSIS (H): ICD-10-CM

## 2017-09-29 NOTE — TELEPHONE ENCOUNTER
escitalopram (LEXAPRO) 20 MG     Last Written Prescription Date: 08/24/2017  Last Fill Quantity: 180,08/28/2017 # refills: 0  Last Office Visit with [facility] primary care provider:  7/20/2017-Rosetta Calloway        Last PHQ-9 score on record=   PHQ-9 SCORE 5/16/2017   Total Score -   Total Score 12         armodafinil (NUVIGIL) 250 MG TABS tablet  Last Written Prescription Date: 07/18/2017  Last Fill Quantity: 30,  # refills: 3   Last Office Visit with [facility], [facility] or [facility] prescribing provider: 07/20/2017-Rosetta Calloway

## 2017-10-02 ENCOUNTER — TELEPHONE (OUTPATIENT)
Dept: FAMILY MEDICINE | Facility: CLINIC | Age: 47
End: 2017-10-02

## 2017-10-02 DIAGNOSIS — F41.1 ANXIETY STATE: ICD-10-CM

## 2017-10-02 DIAGNOSIS — G35 MULTIPLE SCLEROSIS (H): ICD-10-CM

## 2017-10-02 NOTE — TELEPHONE ENCOUNTER
Routing refill request to provider for review/approval because:  Drug Nuvigil not on the [facility] refill protocol

## 2017-10-03 RX ORDER — ESCITALOPRAM OXALATE 20 MG/1
TABLET ORAL
Qty: 180 TABLET | Refills: 0 | Status: SHIPPED | OUTPATIENT
Start: 2017-10-03

## 2017-10-03 RX ORDER — BUSPIRONE HYDROCHLORIDE 10 MG/1
TABLET ORAL
Qty: 120 TABLET | Refills: 0 | Status: SHIPPED | OUTPATIENT
Start: 2017-10-03

## 2017-10-03 RX ORDER — ARMODAFINIL 250 MG/1
TABLET ORAL
Qty: 30 TABLET | OUTPATIENT
Start: 2017-10-03

## 2017-10-03 RX ORDER — ARMODAFINIL 250 MG/1
250 TABLET ORAL EVERY MORNING
Qty: 30 TABLET | Refills: 3 | Status: SHIPPED | OUTPATIENT
Start: 2017-10-03

## 2017-10-03 RX ORDER — AMITRIPTYLINE HYDROCHLORIDE 100 MG/1
100 TABLET ORAL AT BEDTIME
Qty: 30 TABLET | Refills: 1 | Status: SHIPPED | OUTPATIENT
Start: 2017-10-03

## 2017-10-03 NOTE — TELEPHONE ENCOUNTER
BUSPIRONE HCL 10MG TABS    Last Written Prescription Date: 05-  Last Fill Quantity: 08-; #120 refills: 0  Last Office Visit with [facility], [facility] or  Health prescribing provider:  07- Rosetta Calloway.        Last PHQ-9 score on record=   PHQ-9 SCORE 5/16/2017   Total Score -   Total Score 12       Lab Results   Component Value Date    AST 23 02/04/2016     Lab Results   Component Value Date    ALT 52 02/04/2016       AMITRIPTYLINE HCL 25MG TABS          Last Written Prescription Date: 05-  Last Fill Quantity: 07/20/2017, # 90refills: 1  Last Office Visit with [facility], [facility] or [facility] prescribing provider: 07- Rosetta Calloway.       BP Readings from Last 3 Encounters:   05/16/17 132/78   09/16/16 127/83   02/04/16 135/80

## 2017-10-03 NOTE — TELEPHONE ENCOUNTER
Routing refill request to provider for review/approval because:  Last OV 9/16/16   No shows:  7/20/17 5/23/17 4/18/17  Stewart Muller RN

## 2017-10-03 NOTE — TELEPHONE ENCOUNTER
Patient expecting rx for Armodafinil to be mailed to her. Please send rx to [facility].

## 2017-11-28 DIAGNOSIS — K59.02 CONSTIPATION DUE TO OUTLET DYSFUNCTION: ICD-10-CM

## 2017-11-28 DIAGNOSIS — G35 MULTIPLE SCLEROSIS (H): ICD-10-CM

## 2017-11-30 RX ORDER — POLYETHYLENE GLYCOL 3350 17 G/17G
POWDER, FOR SOLUTION ORAL
Qty: 510 G | Refills: 3 | Status: SHIPPED | OUTPATIENT
Start: 2017-11-30 | End: 2018-03-15

## 2017-11-30 NOTE — TELEPHONE ENCOUNTER
Prescription approved per [facility] Refill Protocol  Angelica Durand RN BS

## 2017-12-15 DIAGNOSIS — Z13.6 CARDIOVASCULAR SCREENING; LDL GOAL LESS THAN 160: ICD-10-CM

## 2017-12-15 DIAGNOSIS — G35 MULTIPLE SCLEROSIS (H): ICD-10-CM

## 2017-12-16 NOTE — TELEPHONE ENCOUNTER
diphenhydrAMINE-acetaminophen (TYLENOL PM)  MG tablet      Last Written Prescription Date:  5/23/17  Last Fill Quantity: 180,   # refills: 1  Last Office Visit: 5/16/17  Future Office visit:       Routing refill request to provider for review/approval because:  Drug not on the [facility], [facility] or [facility] refill protocol or controlled substance

## 2017-12-16 NOTE — TELEPHONE ENCOUNTER
fish oil-omega-3 fatty acids 1000 MG capsule      Last Written Prescription Date:  2/6/17  Last Fill Quantity: 60,   # refills: 5  Last Office Visit: 5/16/17  Future Office visit:       Routing refill request to provider for review/approval because:  Drug not on the [facility], [facility] or [facility] refill protocol or controlled substance

## 2017-12-19 RX ORDER — CHLORAL HYDRATE 500 MG
CAPSULE ORAL
Qty: 60 CAPSULE | Refills: 5 | Status: SHIPPED | OUTPATIENT
Start: 2017-12-19

## 2018-03-15 DIAGNOSIS — G35 MULTIPLE SCLEROSIS (H): ICD-10-CM

## 2018-03-15 DIAGNOSIS — K59.02 CONSTIPATION DUE TO OUTLET DYSFUNCTION: ICD-10-CM

## 2018-03-15 RX ORDER — POLYETHYLENE GLYCOL 3350 17 G/17G
POWDER, FOR SOLUTION ORAL
Qty: 510 G | Refills: 3 | Status: SHIPPED | OUTPATIENT
Start: 2018-03-15

## 2018-03-15 NOTE — TELEPHONE ENCOUNTER
"Requested Prescriptions   Pending Prescriptions Disp Refills     polyethylene glycol (MIRALAX/GLYCOLAX) powder [Pharmacy Med Name: POLYETHYLENE GLYCOL 3350  POWD]    Last Written Prescription Date:  11/30/17  Last Fill Quantity: 510g,  # refills: 3   Last office visit: 5/16/2017 with prescribing provider:  Brdoie   Future Office Visit:   510 g 3     Sig: TAKE 1 CAPFUL (17G) DISSOLVED IN LIQUID BY MOUTH DAILY    Laxatives Protocol Passed    3/15/2018  3:28 PM       Passed - Patient is age 6 or older       Passed - Recent (12 mo) or future (30 days) visit within the authorizing provider's specialty    Patient had office visit in the last 12 months or has a visit in the next 30 days with authorizing provider or within the authorizing provider's specialty.  See \"Patient Info\" tab in inbasket, or \"Choose Columns\" in Meds & Orders section of the refill encounter.              "

## 2018-03-21 DIAGNOSIS — G35 MULTIPLE SCLEROSIS (H): ICD-10-CM

## 2018-03-22 NOTE — TELEPHONE ENCOUNTER
Left message to call back.  Was due for f/u November 2017. Offer appointment.   Stewart Muller RN

## 2018-03-22 NOTE — TELEPHONE ENCOUNTER
"Requested Prescriptions   Pending Prescriptions Disp Refills     amitriptyline (ELAVIL) 100 MG tablet [Pharmacy Med Name: AMITRIPTYLINE * 100MG * TAB] 30 tablet 1    Last Written Prescription Date:  10/3/17  Last Fill Quantity: 30,  # refills: 3   Last Office Visit: 5/16/2017   Future Office Visit:      Sig: TAKE ONE TABLET (100MG) BY MOUTH AT BEDTIME    Tricyclic Agents ( Annual appt and no PHQ9) Passed    3/21/2018  2:27 PM       Passed - Blood Pressure under 140/90 in past 12 mos    BP Readings from Last 3 Encounters:   05/16/17 132/78   09/16/16 127/83   02/04/16 135/80                Passed - Recent (12 mo) or future (30 days) visit within authorizing provider's specialty    Patient had office visit in the last 12 months or has a visit in the next 30 days with authorizing provider or within the authorizing provider's specialty.  See \"Patient Info\" tab in inbasket, or \"Choose Columns\" in Meds & Orders section of the refill encounter.           Passed - Patient is age 18 or older       Passed - Patient is not pregnant       Passed - No positive pregnancy test on record in past 12 mos          "

## 2018-03-23 RX ORDER — AMITRIPTYLINE HYDROCHLORIDE 100 MG/1
TABLET ORAL
Qty: 30 TABLET | Refills: 1 | OUTPATIENT
Start: 2018-03-23

## 2018-03-23 NOTE — TELEPHONE ENCOUNTER
Left message to call back.    Last OV 5/16/17. Will increase lexapro to 40 mg  F/u in 6 months.   Two no-shows since.  Rosetta, Please advise  Stewart Muller RN

## 2018-06-26 ENCOUNTER — TELEPHONE (OUTPATIENT)
Dept: FAMILY MEDICINE | Facility: CLINIC | Age: 48
End: 2018-06-26

## 2018-06-26 NOTE — TELEPHONE ENCOUNTER
Received 2 page fax for Prescription/Certificate of Medical Necessity for Dr Marie to complete. Form in the in-basket at [facility] in AA's folder.

## 2018-08-10 DIAGNOSIS — G35 MULTIPLE SCLEROSIS (H): ICD-10-CM

## 2018-08-10 DIAGNOSIS — Z13.6 CARDIOVASCULAR SCREENING; LDL GOAL LESS THAN 160: ICD-10-CM

## 2018-08-11 NOTE — TELEPHONE ENCOUNTER
"Requested Prescriptions   Pending Prescriptions Disp Refills     Multiple Vitamin (TAB-A-EWA) TABS [Pharmacy Med Name: TAB-A-EWA  TABS] 100 tablet 3    Last Written Prescription Date:  05/16/2017  Last Fill Quantity: 100 TABLET,  # refills: 3   Last office visit: 5/16/2017 with prescribing provider:  07/20/2017   Future Office Visit:     Sig: TAKE ONE TABLET BY MOUTH EVERY DAY    Vitamin Supplements (Adult) Protocol Failed    8/10/2018  3:10 PM       Failed - Recent (12 mo) or future (30 days) visit within the authorizing provider's specialty    Patient had office visit in the last 12 months or has a visit in the next 30 days with authorizing provider or within the authorizing provider's specialty.  See \"Patient Info\" tab in inbasket, or \"Choose Columns\" in Meds & Orders section of the refill encounter.           Passed - High dose Vitamin D not ordered            Eldridge-3 1000 MG CAPS [Pharmacy Med Name: OMEGA-3 1000MG CAPS] 60 capsule 5    Last Written Prescription Date:  12/19/2017  Last Fill Quantity: 60 CAPSULE,  # refills: 3   Last office visit: 5/16/2017 with prescribing provider:  07/20/2017   Future Office Visit:     Sig: TAKE TWO CAPSULES BY MOUTH EVERY DAY    Vitamin Supplements (Adult) Protocol Failed    8/10/2018  3:10 PM       Failed - Recent (12 mo) or future (30 days) visit within the authorizing provider's specialty    Patient had office visit in the last 12 months or has a visit in the next 30 days with authorizing provider or within the authorizing provider's specialty.  See \"Patient Info\" tab in inbasket, or \"Choose Columns\" in Meds & Orders section of the refill encounter.           Passed - High dose Vitamin D not ordered          Jorden JOHNSON  "

## 2018-08-14 RX ORDER — MULTIVITAMIN WITH FOLIC ACID 400 MCG
TABLET ORAL
Qty: 100 TABLET | Refills: 3 | OUTPATIENT
Start: 2018-08-14

## 2018-08-14 RX ORDER — CHLORAL HYDRATE 500 MG
CAPSULE ORAL
Qty: 60 CAPSULE | Refills: 5 | OUTPATIENT
Start: 2018-08-14

## 2018-08-14 NOTE — TELEPHONE ENCOUNTER
Routing refill request to provider for review/approval because:  Patient needs to be seen because it has been more than 1 year since last office visit.  Pt has no showed to multiple appts  Dwayne King RN, BSN

## 2019-04-19 ENCOUNTER — HEALTH MAINTENANCE LETTER (OUTPATIENT)
Age: 49
End: 2019-04-19

## 2019-05-21 ENCOUNTER — TELEPHONE (OUTPATIENT)
Dept: FAMILY MEDICINE | Facility: CLINIC | Age: 49
End: 2019-05-21

## 2019-05-21 NOTE — TELEPHONE ENCOUNTER
In my outbox.   Patient's been getting care at [place].  Appears PCP is there.    Rosetta Calloway PA-C

## 2019-05-21 NOTE — TELEPHONE ENCOUNTER
Received 2 page fax for Prescription/Certificate of Medical Necessity for Rosetta Calloway to complete. Form in the in-basket at 's desk.

## 2019-09-30 ENCOUNTER — HEALTH MAINTENANCE LETTER (OUTPATIENT)
Age: 49
End: 2019-09-30

## 2020-03-15 ENCOUNTER — HEALTH MAINTENANCE LETTER (OUTPATIENT)
Age: 50
End: 2020-03-15

## 2021-01-15 ENCOUNTER — HEALTH MAINTENANCE LETTER (OUTPATIENT)
Age: 51
End: 2021-01-15

## 2021-03-14 ENCOUNTER — HEALTH MAINTENANCE LETTER (OUTPATIENT)
Age: 51
End: 2021-03-14

## 2021-05-05 ENCOUNTER — DOCUMENTATION ONLY (OUTPATIENT)
Dept: OTHER | Facility: CLINIC | Age: 51
End: 2021-05-05

## 2021-05-06 ENCOUNTER — DOCUMENTATION ONLY (OUTPATIENT)
Dept: OTHER | Facility: CLINIC | Age: 51
End: 2021-05-06

## 2021-05-09 ENCOUNTER — HEALTH MAINTENANCE LETTER (OUTPATIENT)
Age: 51
End: 2021-05-09

## 2021-10-24 ENCOUNTER — HEALTH MAINTENANCE LETTER (OUTPATIENT)
Age: 51
End: 2021-10-24

## 2021-11-09 ENCOUNTER — DOCUMENTATION ONLY (OUTPATIENT)
Dept: OTHER | Facility: CLINIC | Age: 51
End: 2021-11-09
Payer: MEDICARE

## 2022-04-19 ENCOUNTER — DOCUMENTATION ONLY (OUTPATIENT)
Dept: OTHER | Facility: CLINIC | Age: 52
End: 2022-04-19
Payer: MEDICARE

## 2022-06-05 ENCOUNTER — HEALTH MAINTENANCE LETTER (OUTPATIENT)
Age: 52
End: 2022-06-05

## 2022-10-15 ENCOUNTER — HEALTH MAINTENANCE LETTER (OUTPATIENT)
Age: 52
End: 2022-10-15

## 2023-04-29 NOTE — TELEPHONE ENCOUNTER
[facility] [employer] calling regarding Hydrocortisone Valerate not being on formulary.  Below 3 options are all covered.        [place]ort  Hydrocortisone Butyrate  Hydrocortisone    Merlyn Gutierrez RN     Yes - the patient is able to be screened

## 2023-06-11 ENCOUNTER — HEALTH MAINTENANCE LETTER (OUTPATIENT)
Age: 53
End: 2023-06-11